# Patient Record
Sex: MALE | Race: WHITE | Employment: UNEMPLOYED | URBAN - METROPOLITAN AREA
[De-identification: names, ages, dates, MRNs, and addresses within clinical notes are randomized per-mention and may not be internally consistent; named-entity substitution may affect disease eponyms.]

---

## 2018-04-24 ENCOUNTER — HOSPITAL ENCOUNTER (OUTPATIENT)
Age: 23
Discharge: HOME | End: 2018-04-24
Payer: MEDICAID

## 2018-04-24 LAB
EKG ATRIAL RATE: 65 BPM
EKG P AXIS: 63 DEGREES
EKG P-R INTERVAL: 126 MS
EKG Q-T INTERVAL: 374 MS
EKG QRS DURATION: 102 MS
EKG QTC CALCULATION (BAZETT): 388 MS
EKG R AXIS: 70 DEGREES
EKG T AXIS: 63 DEGREES
EKG VENTRICULAR RATE: 65 BPM

## 2018-04-24 PROCEDURE — 93010 ELECTROCARDIOGRAM REPORT: CPT | Performed by: INTERNAL MEDICINE

## 2018-04-24 PROCEDURE — 93005 ELECTROCARDIOGRAM TRACING: CPT | Performed by: NURSE PRACTITIONER

## 2018-11-05 ENCOUNTER — HOSPITAL ENCOUNTER (OUTPATIENT)
Age: 23
Discharge: HOME | End: 2018-11-07
Payer: COMMERCIAL

## 2018-11-05 ENCOUNTER — OFFICE VISIT (OUTPATIENT)
Dept: FAMILY MEDICINE CLINIC | Age: 23
End: 2018-11-05
Payer: COMMERCIAL

## 2018-11-05 VITALS
WEIGHT: 144.4 LBS | SYSTOLIC BLOOD PRESSURE: 110 MMHG | OXYGEN SATURATION: 97 % | DIASTOLIC BLOOD PRESSURE: 72 MMHG | HEART RATE: 91 BPM | BODY MASS INDEX: 22.66 KG/M2 | HEIGHT: 67 IN

## 2018-11-05 DIAGNOSIS — F17.200 SMOKER: Primary | ICD-10-CM

## 2018-11-05 DIAGNOSIS — R06.02 SOB (SHORTNESS OF BREATH): ICD-10-CM

## 2018-11-05 PROCEDURE — 93000 ELECTROCARDIOGRAM COMPLETE: CPT | Performed by: PHYSICIAN ASSISTANT

## 2018-11-05 PROCEDURE — 80053 COMPREHEN METABOLIC PANEL: CPT

## 2018-11-05 PROCEDURE — G8484 FLU IMMUNIZE NO ADMIN: HCPCS | Performed by: PHYSICIAN ASSISTANT

## 2018-11-05 PROCEDURE — 85027 COMPLETE CBC AUTOMATED: CPT

## 2018-11-05 PROCEDURE — G8420 CALC BMI NORM PARAMETERS: HCPCS | Performed by: PHYSICIAN ASSISTANT

## 2018-11-05 PROCEDURE — 99203 OFFICE O/P NEW LOW 30 MIN: CPT | Performed by: PHYSICIAN ASSISTANT

## 2018-11-05 PROCEDURE — 4004F PT TOBACCO SCREEN RCVD TLK: CPT | Performed by: PHYSICIAN ASSISTANT

## 2018-11-05 PROCEDURE — G8427 DOCREV CUR MEDS BY ELIG CLIN: HCPCS | Performed by: PHYSICIAN ASSISTANT

## 2018-11-05 RX ORDER — ALBUTEROL SULFATE 90 UG/1
2 AEROSOL, METERED RESPIRATORY (INHALATION) EVERY 4 HOURS PRN
Qty: 1 INHALER | Refills: 0 | Status: SHIPPED | OUTPATIENT
Start: 2018-11-05

## 2018-11-05 ASSESSMENT — PATIENT HEALTH QUESTIONNAIRE - PHQ9
SUM OF ALL RESPONSES TO PHQ QUESTIONS 1-9: 0
SUM OF ALL RESPONSES TO PHQ QUESTIONS 1-9: 0
1. LITTLE INTEREST OR PLEASURE IN DOING THINGS: 0
2. FEELING DOWN, DEPRESSED OR HOPELESS: 0
SUM OF ALL RESPONSES TO PHQ9 QUESTIONS 1 & 2: 0

## 2018-11-06 LAB
ALBUMIN SERPL-MCNC: 4.1 G/DL (ref 3.5–5.2)
ALP BLD-CCNC: 55 U/L (ref 40–129)
ALT SERPL-CCNC: 9 U/L (ref 0–40)
ANION GAP SERPL CALCULATED.3IONS-SCNC: 18 MMOL/L (ref 7–16)
AST SERPL-CCNC: 22 U/L (ref 0–39)
BILIRUB SERPL-MCNC: 0.6 MG/DL (ref 0–1.2)
BUN BLDV-MCNC: 13 MG/DL (ref 6–20)
CALCIUM SERPL-MCNC: 9.4 MG/DL (ref 8.6–10.2)
CHLORIDE BLD-SCNC: 104 MMOL/L (ref 98–107)
CO2: 24 MMOL/L (ref 22–29)
CREAT SERPL-MCNC: 1 MG/DL (ref 0.7–1.2)
GFR AFRICAN AMERICAN: >60
GFR NON-AFRICAN AMERICAN: >60 ML/MIN/1.73
GLUCOSE BLD-MCNC: 69 MG/DL (ref 74–99)
HCT VFR BLD CALC: 46 % (ref 37–54)
HEMOGLOBIN: 15.4 G/DL (ref 12.5–16.5)
MCH RBC QN AUTO: 30.2 PG (ref 26–35)
MCHC RBC AUTO-ENTMCNC: 33.5 % (ref 32–34.5)
MCV RBC AUTO: 90.2 FL (ref 80–99.9)
PDW BLD-RTO: 14.2 FL (ref 11.5–15)
PLATELET # BLD: 223 E9/L (ref 130–450)
PMV BLD AUTO: 12.1 FL (ref 7–12)
POTASSIUM SERPL-SCNC: 4.9 MMOL/L (ref 3.5–5)
RBC # BLD: 5.1 E12/L (ref 3.8–5.8)
SODIUM BLD-SCNC: 146 MMOL/L (ref 132–146)
TOTAL PROTEIN: 6.7 G/DL (ref 6.4–8.3)
WBC # BLD: 8 E9/L (ref 4.5–11.5)

## 2018-12-02 ENCOUNTER — APPOINTMENT (OUTPATIENT)
Dept: GENERAL RADIOLOGY | Age: 23
End: 2018-12-02
Payer: COMMERCIAL

## 2018-12-02 ENCOUNTER — HOSPITAL ENCOUNTER (EMERGENCY)
Age: 23
Discharge: HOME | End: 2018-12-02
Attending: EMERGENCY MEDICINE
Payer: COMMERCIAL

## 2018-12-02 VITALS
HEART RATE: 97 BPM | SYSTOLIC BLOOD PRESSURE: 129 MMHG | BODY MASS INDEX: 22.6 KG/M2 | WEIGHT: 144 LBS | TEMPERATURE: 99 F | RESPIRATION RATE: 18 BRPM | HEIGHT: 67 IN | OXYGEN SATURATION: 95 % | DIASTOLIC BLOOD PRESSURE: 63 MMHG

## 2018-12-02 DIAGNOSIS — J02.9 ACUTE PHARYNGITIS, UNSPECIFIED ETIOLOGY: Primary | ICD-10-CM

## 2018-12-02 DIAGNOSIS — M54.5 LOW BACK PAIN, UNSPECIFIED BACK PAIN LATERALITY, UNSPECIFIED CHRONICITY, WITH SCIATICA PRESENCE UNSPECIFIED: ICD-10-CM

## 2018-12-02 DIAGNOSIS — R50.9 FEVER, UNSPECIFIED FEVER CAUSE: ICD-10-CM

## 2018-12-02 LAB
MONO TEST: NEGATIVE
STREP GRP A PCR: NEGATIVE

## 2018-12-02 PROCEDURE — 87880 STREP A ASSAY W/OPTIC: CPT

## 2018-12-02 PROCEDURE — 72110 X-RAY EXAM L-2 SPINE 4/>VWS: CPT

## 2018-12-02 PROCEDURE — 6360000002 HC RX W HCPCS

## 2018-12-02 PROCEDURE — 6370000000 HC RX 637 (ALT 250 FOR IP): Performed by: NURSE PRACTITIONER

## 2018-12-02 PROCEDURE — 99283 EMERGENCY DEPT VISIT LOW MDM: CPT

## 2018-12-02 PROCEDURE — 6360000002 HC RX W HCPCS: Performed by: NURSE PRACTITIONER

## 2018-12-02 PROCEDURE — 86308 HETEROPHILE ANTIBODY SCREEN: CPT

## 2018-12-02 PROCEDURE — 96372 THER/PROPH/DIAG INJ SC/IM: CPT

## 2018-12-02 PROCEDURE — 36415 COLL VENOUS BLD VENIPUNCTURE: CPT

## 2018-12-02 RX ORDER — AMOXICILLIN AND CLAVULANATE POTASSIUM 875; 125 MG/1; MG/1
1 TABLET, FILM COATED ORAL 2 TIMES DAILY
Qty: 14 TABLET | Refills: 0 | Status: SHIPPED | OUTPATIENT
Start: 2018-12-02 | End: 2018-12-09

## 2018-12-02 RX ORDER — DEXAMETHASONE SODIUM PHOSPHATE 4 MG/ML
INJECTION, SOLUTION INTRA-ARTICULAR; INTRALESIONAL; INTRAMUSCULAR; INTRAVENOUS; SOFT TISSUE
Status: COMPLETED
Start: 2018-12-02 | End: 2018-12-02

## 2018-12-02 RX ORDER — ACETAMINOPHEN 500 MG
1000 TABLET ORAL ONCE
Status: COMPLETED | OUTPATIENT
Start: 2018-12-02 | End: 2018-12-02

## 2018-12-02 RX ORDER — KETOROLAC TROMETHAMINE 30 MG/ML
30 INJECTION, SOLUTION INTRAMUSCULAR; INTRAVENOUS ONCE
Status: COMPLETED | OUTPATIENT
Start: 2018-12-02 | End: 2018-12-02

## 2018-12-02 RX ORDER — DEXAMETHASONE SODIUM PHOSPHATE 10 MG/ML
10 INJECTION INTRAMUSCULAR; INTRAVENOUS ONCE
Status: DISCONTINUED | OUTPATIENT
Start: 2018-12-02 | End: 2018-12-02 | Stop reason: HOSPADM

## 2018-12-02 RX ADMIN — ACETAMINOPHEN 1000 MG: 500 TABLET ORAL at 17:47

## 2018-12-02 RX ADMIN — DEXAMETHASONE SODIUM PHOSPHATE 10 MG: 4 INJECTION, SOLUTION INTRAMUSCULAR; INTRAVENOUS at 18:31

## 2018-12-02 RX ADMIN — KETOROLAC TROMETHAMINE 30 MG: 30 INJECTION, SOLUTION INTRAMUSCULAR at 17:47

## 2018-12-02 ASSESSMENT — PAIN DESCRIPTION - LOCATION: LOCATION: BACK;THROAT

## 2018-12-02 ASSESSMENT — PAIN SCALES - GENERAL
PAINLEVEL_OUTOF10: 8
PAINLEVEL_OUTOF10: 8

## 2018-12-02 ASSESSMENT — PAIN DESCRIPTION - PAIN TYPE: TYPE: ACUTE PAIN

## 2018-12-02 NOTE — ED NOTES
Patient medicated. Blood work and throat swab collected and sent to lab.       Dee Tomlinson RN  12/02/18 0137

## 2018-12-02 NOTE — ED NOTES
Patient discharged with belongings. Discussed care instructions, follow-up instructions, medications and when to return to the hospital. Patient verbalizes understanding and has no further questions at this time. Electronically signed by Segun Ashby.  DOUGLAS Villalobos on 12/2/2018 at 6:49 PM       Miley Yee RN  12/02/18 [id number]

## 2018-12-02 NOTE — ED PROVIDER NOTES
ED Attending  CC: Marcie       Department of Emergency Medicine   ED  Provider Note  Admit Date/RoomTime: 12/2/2018  5:01 PM  ED Room: 37/37  Chief Complaint   Back Pain (x3 days) and Pharyngitis    History of Present Illness   Source of history provided by:  patient. History/Exam Limitations: none. Abiel Ambrose is a 24 y.o. old male who has a past medical history of:   Past Medical History:   Diagnosis Date    ADHD (attention deficit hyperactivity disorder)     Bipolar 1 disorder ([address]     presents to the emergency department by private vehicle, for bilateral sore throat pain, which occured 3 day(s) prior to arrival. Associated Signs & Symptoms: upper midline and bilateral lumbar back pain without injury or trauma Since onset the symptoms have been persistent. He is drinking moderate amounts of fluids. Patient states that he started with fever and sore throat 3 days ago. Patient states that he also has bilateral upper lumbar back pain that started 3 days ago. Patient has not taken any medications for this. Patient denies any injury or trauma to the back. Patient states that he does have a history of back pain secondary to scoliosis. Patient denies any loss of bowel or bladder, bowel or bladder retention, IV drug use. Patient denies any urinary symptoms. Patient denies any shortness of breath, chest pain, palpitations, abdominal pain, vomiting. Exposed To: Streptococcus: unknown. Infectious Mononucleosis:  unknown. Symptoms:  Pain:  Yes. Muffled Voice:  No.                            Hoarse:  Yes.                             Difficulty with Secretions:  No.    Centor Score (MODIFIED) For Strep Pharyngitis:    Age 3-44 Years   yes (1)     Age >44 Years   NO     Exudate or Swelling on Tonsils   yes (1)     Tender/Swollen Anterior Cervical Nodes   no (0)     Fever? (T > 38°C, 100.4°F)   yes (1)     Absence of Cough   yes (1)   TOTAL POINTS 4   Score of Zero = Probability of Strep Pharyngitis: 1% - 2.5%. ,   No further testing nor antibiotics. Score of 1 = Probability of Strep Pharyngitis: 5% - 10%. ,   No further testing nor antibiotics. Score of 2 = Probability of Strep Phar: 11% - 17%. Culture/test all, ATB's only for positive culture results. Score of 3 = Probability of Strep Phar: 28% - 35%. Culture/test all, ATB's only for positive culture results  Score of 4 or 5 = Probability of Strep Pharyngitis: 51% - 53%. Treat empirically with antibiotics. ROS    Pertinent positives and negatives are stated within HPI, all other systems reviewed and are negative. Past Surgical History:  has no past surgical history on file. Social History:  reports that he has been smoking Cigarettes. He started smoking about 16 years ago. He has a 3.00 pack-year smoking history. He has never used smokeless tobacco. He reports that he drinks alcohol. He reports that he uses drugs, including Marijuana. Family History: family history includes Diabetes in his mother; High Blood Pressure in his mother; High Cholesterol in his mother. Allergies: Patient has no known allergies. Physical Exam           ED Triage Vitals [12/02/18 1700]   BP Temp Temp src Pulse Resp SpO2 Height Weight   129/63 102.9 °F (39.4 °C) -- 97 18 95 % 5' 7\" (1.702 m) 144 lb (65.3 kg)     Oxygen Saturation Interpretation: Normal.  Constitutional:  Alert, development consistent with age. NAD  Ears:  TMs without perforation, injection, or bulging. External canals clear without exudate. No signs of mastoiditis bilaterally. Throat: Airway Patent. Patient has +3 bilateral tonsillar hypertrophy with erythema, no exudate. Patient handling secretions without difficulty. No unilateral swelling. No uveal deviation. No sign of retropharyngeal or peritonsillar abscess. Neck/Lymphatic:  Supple. No lymphadenopathy, no meningeal signs. No midline or spine tenderness.  No step-offs or acute distress, nontoxic in appearance. Patient handling secretions without difficulty. Patient's vital signs improved with medication in the emergency department. MDM:   Based on moderate suspicion for Strep as per history/physical findings, Rapid Strep/Throat Culture testing was done and confirms the above findings  Pharyngitis may  be Strep. Antibiotics are indicated at this time based on clinical presentation and physical findings. Not hypoxic, nothing to suggest pneumonia. Patient is well appearing, non toxic and appropriate for outpatient management. Patient presents to the emergency department today for 3 day history of sore throat as well as nontraumatic upper lumbar back pain. Strep and mono were negative. X-ray of the spine was negative. Patient's vital signs improved with medication in the emergency department. Patient handling secretions without difficulty. Patient has a Centor score of 4 and will be treated with antibiotics prophylactically at this time. He was instructed to follow-up with his primary care provider. Patient nontoxic in appearance. Patient was explicitly instructed on specific signs and symptoms on which to return to the emergency room for. Patient was instructed to return to the ER for any new or worsening symptoms. Additional discharge instructions were given verbally. All questions were answered. Patient is comfortable and agreeable with discharge plan. Patient in no acute distress and non-toxic in appearance. Plan of Care: Normal progression of disease discussed. All questions answered. Explained the rationale for symptomatic treatment. Instruction provided in the use of fluids, vaporizer, acetaminophen, and other OTC medication for symptom control. Extra fluids  Analgesics as needed, dose reviewed. Follow up as needed should symptoms fail to improve.      At this time the patient is without objective evidence of an acute process requiring hospitalization or inpatient

## 2022-12-19 ENCOUNTER — APPOINTMENT (OUTPATIENT)
Dept: GENERAL RADIOLOGY | Age: 27
End: 2022-12-19

## 2022-12-19 ENCOUNTER — HOSPITAL ENCOUNTER (EMERGENCY)
Age: 27
Discharge: HOME | End: 2022-12-19

## 2022-12-19 VITALS
TEMPERATURE: 98.5 F | OXYGEN SATURATION: 100 % | HEART RATE: 82 BPM | SYSTOLIC BLOOD PRESSURE: 118 MMHG | RESPIRATION RATE: 15 BRPM | DIASTOLIC BLOOD PRESSURE: 71 MMHG | WEIGHT: 145 LBS | HEIGHT: 67 IN | BODY MASS INDEX: 22.76 KG/M2

## 2022-12-19 DIAGNOSIS — R11.0 NAUSEA: ICD-10-CM

## 2022-12-19 DIAGNOSIS — J10.1 INFLUENZA A: Primary | ICD-10-CM

## 2022-12-19 LAB
INFLUENZA A BY PCR: DETECTED
INFLUENZA B BY PCR: NOT DETECTED
SARS-COV-2, NAAT: NOT DETECTED
STREP GRP A PCR: NEGATIVE

## 2022-12-19 PROCEDURE — 6370000000 HC RX 637 (ALT 250 FOR IP): Performed by: PHYSICIAN ASSISTANT

## 2022-12-19 PROCEDURE — 87635 SARS-COV-2 COVID-19 AMP PRB: CPT

## 2022-12-19 PROCEDURE — 99284 EMERGENCY DEPT VISIT MOD MDM: CPT

## 2022-12-19 PROCEDURE — 87502 INFLUENZA DNA AMP PROBE: CPT

## 2022-12-19 PROCEDURE — 71046 X-RAY EXAM CHEST 2 VIEWS: CPT

## 2022-12-19 PROCEDURE — 87880 STREP A ASSAY W/OPTIC: CPT

## 2022-12-19 RX ORDER — ONDANSETRON 4 MG/1
4 TABLET, ORALLY DISINTEGRATING ORAL 3 TIMES DAILY PRN
Qty: 9 TABLET | Refills: 0 | Status: SHIPPED | OUTPATIENT
Start: 2022-12-19 | End: 2022-12-22

## 2022-12-19 RX ORDER — ACETAMINOPHEN 325 MG/1
650 TABLET ORAL ONCE
Status: COMPLETED | OUTPATIENT
Start: 2022-12-19 | End: 2022-12-19

## 2022-12-19 RX ORDER — ONDANSETRON 4 MG/1
4 TABLET, ORALLY DISINTEGRATING ORAL ONCE
Status: COMPLETED | OUTPATIENT
Start: 2022-12-19 | End: 2022-12-19

## 2022-12-19 RX ADMIN — ACETAMINOPHEN 650 MG: 325 TABLET ORAL at 10:00

## 2022-12-19 RX ADMIN — ONDANSETRON 4 MG: 4 TABLET, ORALLY DISINTEGRATING ORAL at 10:00

## 2022-12-19 ASSESSMENT — PAIN - FUNCTIONAL ASSESSMENT
PAIN_FUNCTIONAL_ASSESSMENT: NONE - DENIES PAIN
PAIN_FUNCTIONAL_ASSESSMENT: NONE - DENIES PAIN

## 2022-12-19 NOTE — ED PROVIDER NOTES
[address]   Department of Emergency Medicine   ED  Encounter Note  Admit Date/RoomTime: 2022  9:20 AM  ED Room: 36/36    NAME: Milli Oneil. : 1995  MRN: [id number]     Chief Complaint:  Fatigue (\"Weak, drowsy, and nauseated since Friday\" ) and Nausea    History of Present Illness       Milli López is a 28 y.o. old male who presents to the emergency department by private vehicle, for body aches, nonproductive cough, fatigue, nausea, sore throat, which began 3 day(s) prior to arrival.  Since onset the symptoms have been remaining constant and mild in severity. He states that he no longer has a sore throat. He states it went away yesterday. He states that he has had some nausea but no vomiting or abdominal pain. He has been eating and drinking okay. He denies any sick contacts. The symptoms are associated with body aches, generalized weakness, fatigue, sore throat, nonproductive cough, and nausea without vomiting. There has been no abdominal pain, chest tightness, conjunctivitis, productive cough, CP, SOB, calf pain/swelling, pain with inspiration, hemoptysis, vomiting, diarrhea, dizziness, dysuria, urinary frequency, earache, fever, headache, neck stiffness, rash, sneezing, wheezing, loss of taste, or loss of smell. The patient has not received any COVID-19 vaccine or flu vaccine. He notes he has asthma but has not needed his inhaler. He denies any other significant PMH. Nothing aggravates the sxs and nothing alleviates the sxs. ROS   Pertinent positives and negatives are stated within HPI, all other systems reviewed and are negative. Past Medical History:  has a past medical history of ADHD (attention deficit hyperactivity disorder) and Bipolar 1 disorder ([facility] [address] Surgical History:  has no past surgical history on file. Social History:  reports that he has been smoking cigarettes. He started smoking about 20 years ago.  He has a 3.00 pack-year Reports usually takes torsemide 50 mg daily. Reports increased to 100mg daily last wed. Reports 20lb weight gain in last 2 weeks. Pt has dressing to bilat lower ext. Pitting edema to ble to just above bilat knee. Reports was seeing wound care up to approx 1 month ago. Now taking care of legs on own. RAPID INFLUENZA A/B ANTIGENS    Specimen: Nasopharyngeal   Result Value Ref Range    Influenza A by PCR DETECTED (A) Not Detected    Influenza B by PCR Not Detected Not Detected   COVID-19, Rapid    Specimen: Nasopharyngeal Swab   Result Value Ref Range    SARS-CoV-2, NAAT Not Detected Not Detected   Strep Screen Group A Throat    Specimen: Throat   Result Value Ref Range    Strep Grp A PCR Negative Negative       Imaging: All Radiology results interpreted by Radiologist unless otherwise noted. XR CHEST (2 VW)   Final Result   No acute process. ED Course / Medical Decision Making     Medications   acetaminophen (TYLENOL) tablet 650 mg (650 mg Oral Given 12/19/22 1000)   ondansetron (ZOFRAN-ODT) disintegrating tablet 4 mg (4 mg Oral Given 12/19/22 1000)        Re-examination:  12/19/22       Time: prior to discharge   Patients condition is improving after treatment. Tolerating PO fluids. Consults:   None    Procedures:   None    Medical Decision Making:   Patient presents to the ER for 3 days of body aches, sore throat, nonproductive cough, fatigue, nausea. Has no abdominal pain or vomiting. Vitals within normal limits. Tested for flu, COVID, and strep. Has mild erythema to the posterior pharynx but otherwise physical exam is within normal limits. Lungs clear to auscultation bilaterally. He has asthma but does not need his inhaler. No chest pain or shortness of breath. Given PO Tylenol and PO Zofran in the ER. Previous QTC was 393 ms. Flu A positive. Improvement in sxs in the ER. Chest x-ray negative for any pulmonary involvement. COVID and strep negative. Patient tolerating p.o. fluids in the ER. He does not want anything for cough for home. We will send him home with some Zofran for any nausea and vomiting. We will have him follow-up with his PCP. Patient educated on increasing fluid intake and Tylenol/Motrin for any pain.   He has had the symptoms for over 3 days so unable to treat for flu with Tamiflu. All questions answered. Patient agreeable with the plan. Patient is in no acute distress, non-toxic, and appropriate for outpatient management. Pt educated on reasons to return to the ER, including need to return for new or worsening symptoms. Assessment     1. Influenza A    2. Nausea      Plan   Discharged home. Patient condition is stable    New Medications     Discharge Medication List as of 12/19/2022 11:31 AM        START taking these medications    Details   ondansetron (ZOFRAN-ODT) 4 MG disintegrating tablet Take 1 tablet by mouth 3 times daily as needed for Nausea or Vomiting, Disp-9 tablet, R-0Print           Electronically signed by Aquilino Thompson PA-C   DD: 12/19/22  **This report was transcribed using voice recognition software. Every effort was made to ensure accuracy; however, inadvertent computerized transcription errors may be present.   END OF ED PROVIDER NOTE         Messi Walsh PA-C  12/19/22 [id number]

## 2022-12-19 NOTE — Clinical Note
Ander Serrano was seen and treated in our emergency department on 12/19/2022. He may return to work on 12/22/2022. If you have any questions or concerns, please don't hesitate to call.       Sabino Henry PA-C

## 2023-03-08 ENCOUNTER — APPOINTMENT (OUTPATIENT)
Dept: GENERAL RADIOLOGY | Age: 28
End: 2023-03-08

## 2023-03-08 ENCOUNTER — HOSPITAL ENCOUNTER (EMERGENCY)
Age: 28
Discharge: LEFT BEFORE DISCHARGE | End: 2023-03-08
Attending: EMERGENCY MEDICINE

## 2023-03-08 VITALS
SYSTOLIC BLOOD PRESSURE: 137 MMHG | OXYGEN SATURATION: 100 % | RESPIRATION RATE: 19 BRPM | DIASTOLIC BLOOD PRESSURE: 80 MMHG | HEART RATE: 102 BPM

## 2023-03-08 DIAGNOSIS — Z53.29 LEFT AGAINST MEDICAL ADVICE: ICD-10-CM

## 2023-03-08 DIAGNOSIS — T40.601A OPIATE OVERDOSE, ACCIDENTAL OR UNINTENTIONAL, INITIAL ENCOUNTER (HCC): Primary | ICD-10-CM

## 2023-03-08 PROCEDURE — 99281 EMR DPT VST MAYX REQ PHY/QHP: CPT

## 2023-03-08 PROCEDURE — 96374 THER/PROPH/DIAG INJ IV PUSH: CPT

## 2023-03-08 PROCEDURE — 6360000002 HC RX W HCPCS: Performed by: EMERGENCY MEDICINE

## 2023-03-08 RX ORDER — NALOXONE HYDROCHLORIDE 1 MG/ML
2 INJECTION INTRAMUSCULAR; INTRAVENOUS; SUBCUTANEOUS ONCE
Status: COMPLETED | OUTPATIENT
Start: 2023-03-08 | End: 2023-03-08

## 2023-03-08 RX ORDER — 0.9 % SODIUM CHLORIDE 0.9 %
1000 INTRAVENOUS SOLUTION INTRAVENOUS ONCE
Status: DISCONTINUED | OUTPATIENT
Start: 2023-03-08 | End: 2023-03-08 | Stop reason: HOSPADM

## 2023-03-08 RX ADMIN — NALOXONE HYDROCHLORIDE 2 MG: 1 INJECTION PARENTERAL at 20:03

## 2023-03-09 NOTE — ED PROVIDER NOTES
Justus Ricci 33 y.o. male PHMx of polysubstance use disorder-heroin presents to the ED c/o accidental overdose. Upon evaluation the patient he was no protecting his airway, he was unresponsive. He was brought back emergently to the resuscitation bay. She was given 2 mg IV Narcan and he responded very well to this. He became awake and started answering questions and following commands protecting his airway. He reported that he shot up heroin not sure what else was in it. He denies any intentional overdose. Review of Systems   Reason unable to perform ROS: Patient was unresponsive. Physical Exam  Constitutional:       Appearance: He is ill-appearing and toxic-appearing. HENT:      Head: Normocephalic and atraumatic. Right Ear: External ear normal.      Left Ear: External ear normal.      Nose: Nose normal.      Mouth/Throat:      Mouth: Mucous membranes are moist.      Pharynx: Oropharynx is clear. Eyes:      Extraocular Movements: Extraocular movements intact. Pupils: Pupils are equal, round, and reactive to light. Cardiovascular:      Rate and Rhythm: Normal rate and regular rhythm. Pulses: Normal pulses. Heart sounds: Normal heart sounds. Pulmonary:      Effort: Pulmonary effort is normal.      Breath sounds: Normal breath sounds. Abdominal:      General: There is no distension. Palpations: Abdomen is soft. Tenderness: There is no abdominal tenderness. Musculoskeletal:         General: Normal range of motion. Cervical back: Normal range of motion and neck supple. Skin:     General: Skin is warm and dry. Capillary Refill: Capillary refill takes less than 2 seconds. Neurological:      General: No focal deficit present. Mental Status: He is oriented to person, place, and time. Cranial Nerves: No cranial nerve deficit. Motor: No weakness. Psychiatric:         Mood and Affect: Mood normal.         Thought Content:  Thought content normal.        Procedures     Medical Decision Making  Upon initial physical examination and evaluation of the patient He was unresponsive, altered mental status, protecting his airway, he was immediately given 2 mg IV Narcan. He was immediately brought back to the resuscitation bay. He was given additional Narcan. He acutely became aware, start protecting his airway, vital signs became normal, hemodynamically stable, became AOx4, cranial nerves II through XII grossly intact, 5 out of 5 motor strength, answering questions appropriately. Patient admitted that he had excellently overdosed on heroin. Patient was offered admission to the hospital for further evaluation work-up, including imaging and labs and IV medicines. He declined this. He decided to leave [address] He is AOx4. He has complete medical decision making capacity. He has family with him. He was offered admission to the hospital again, he was also told he is welcome to come back anytime. He again declined any further work-up and decided to leave [address] Patient hemoglobin is stable, AOx4, and was able to repeat back the dangers of leaving [address] in plain words and was able to fully express understanding of the risks including death, cardio pulmonary collapse, overdosing again if he were to leave [address] he expressed understanding of this and decided to leave    Amount and/or Complexity of Data Reviewed  Labs: ordered. Radiology: ordered. ECG/medicine tests: ordered. Risk  Prescription drug management.                --------------------------------------------- PAST HISTORY ---------------------------------------------  Past Medical History:  has a past medical history of ADHD (attention deficit hyperactivity disorder) and Bipolar 1 disorder ([facility] [address] Past Surgical History:  has no past surgical history on file. Social History:  reports that he has been smoking cigarettes. He started smoking about 20 years ago. He has a 3.00 pack-year smoking history. He has never used smokeless tobacco. He reports current alcohol use. He reports current drug use. Drug: Marijuana Faye Cardoso). Family History: family history includes Diabetes in his mother; High Blood Pressure in his mother; High Cholesterol in his mother. The patients home medications have been reviewed. Allergies: Patient has no known allergies. -------------------------------------------------- RESULTS -------------------------------------------------  Labs:  No results found for this visit on 03/08/23. Radiology:  XR CHEST PORTABLE    (Results Pending)       ------------------------- NURSING NOTES AND VITALS REVIEWED ---------------------------  Date / Time Roomed:  3/8/2023  8:11 PM  ED Bed Assignment:  04/04    The nursing notes within the ED encounter and vital signs as below have been reviewed. /80   Pulse (!) 102   Resp 19   SpO2 100%   Oxygen Saturation Interpretation: Normal      ------------------------------------------ PROGRESS NOTES ------------------------------------------  8:35 PM EST  I have spoken with the patient and discussed todays results, in addition to providing specific details for the plan of care and counseling regarding the diagnosis and prognosis. Their questions are answered at this time and they are agreeable with the plan. I discussed at length with them reasons for immediate return here for re evaluation. They will followup with their primary care physician by calling their office on Monday.      --------------------------------- ADDITIONAL PROVIDER NOTES ---------------------------------  At this time the patient is without objective evidence of an acute process requiring hospitalization or inpatient management. They have remained hemodynamically stable throughout their entire ED visit and are stable for discharge with outpatient follow-up.      The plan has been discussed in detail and they are aware of the specific conditions for emergent return, as well as the importance of follow-up. New Prescriptions    No medications on file       Diagnosis:  1. Opiate overdose, accidental or unintentional, initial encounter ([facility] [address]    2. Left against medical advice        Disposition:  Patient's disposition: AMA  Patient's condition is stable. I had an extensive discussion with the patient and/or family about their disposition and they agreed with the plan. AMA     The patient is choosing to leave against medical advice after the medical team has attempted to convince them to stay and continue the medical work-up/treatment by explaining their possible diagnoses and treatment options. They understand that leaving without a complete workup could result in death, permanent disability, or a missed diagnosis. They verbalized understanding of possible diagnosis and treatment with no apparent barriers to learning identified. In my opinion, they have clinical capacity. I have encouraged them to return at their earliest convenience to an emergency room, their primary doctor, or any medical treatment facility to resume care. They understand that the emergency room is open 24 hours a day. I cannot identify any social stressors that I could personally alleviate to help the patient make the correct decision to stay in the emergency department.       I     Casi Angeles, DO  Resident  03/08/23 2038       Casi Angeles, DO  Resident  03/08/23 1605       Casi Angeles, DO  Resident  03/08/23 2222

## 2023-03-09 NOTE — ED NOTES
Patient signed out AMA. AMA paper placed in soft chart. Mother is at bedside to transport patient home .  Patient alert and oriented ben Thompson, DOUGLAS  03/08/23 0368